# Patient Record
(demographics unavailable — no encounter records)

---

## 2024-12-19 NOTE — PLAN
[FreeTextEntry1] : PLAN: - Continue Lamotrigine 100mg BID (4mg/kg/day) - Continue Oxcarbazepine 600mg BID (24mg/kg/day) - Folic acid qD - Adderall 10mg qD increase to 20mg qD in 1 month when refill due - CBC, CMP, ferritin, Oxcarbazepine level, Lamotrigine level.  - MRI epilepsy protocol w/ sedation.  - Seizure precautions discussed. - RTC in 3-4 months.

## 2024-12-19 NOTE — CONSULT LETTER
[Dear  ___] : Dear  [unfilled], [Consult Letter:] : I had the pleasure of evaluating your patient, [unfilled]. [Please see my note below.] : Please see my note below. [Consult Closing:] : Thank you very much for allowing me to participate in the care of this patient.  If you have any questions, please do not hesitate to contact me. [Sincerely,] : Sincerely, [FreeTextEntry3] : Jason Wilkes MD PGY- 4 Child Neurology [facility]  Shashank Pantoja MD, FAAN, FAASM Director, Division of Pediatric Neurology Co-Director, Sleep Program for Children (Neurology) [facility]  Professor of Pediatrics & Neurology [employer] at [facility] and [facility] [address] [place] [address] (T) [phone]  (F) [phone]

## 2024-12-19 NOTE — QUALITY MEASURES
[Seizure frequency] : Seizure frequency: Yes [Etiology, seizure type, and epilepsy syndrome] : Etiology, seizure type, and epilepsy syndrome: Yes [Side effects of anti-seizure medications] : Side effects of anti-seizure medications: Yes [Safety and education around seizures] : Safety and education around seizures: Yes [Sudden unexpected death in epilepsy (SUDEP)] : Sudden unexpected death in epilepsy: Not Applicable [Issues around driving] : Issues around driving: Not Applicable [Screening for anxiety, depression] : Screening for anxiety, depression: Yes [Treatment-resistant epilepsy (every visit)] : Treatment-resistant epilepsy (every visit): Yes [Adherence to medication(s)] : Adherence to medication(s): Yes [Counseling for women of childbearing potential with epilepsy (including folic acid supplement)] : Counseling for women of childbearing potential with epilepsy (including folic acid supplement): Yes [Options for adjunctive therapy (Neurostimulation, CBD, Dietary Therapy, Epilepsy Surgery)] : Options for adjunctive therapy (Neurostimulation, CBD, Dietary Therapy, Epilepsy Surgery): Not Applicable [25 Hydroxy Vitamin D level assessed and Vitamin D3 ordered] : 25 Hydroxy Vitamin D level assessed and Vitamin D3 ordered: Yes [Thyroid profile ordered] : Thyroid profile ordered: Not Applicable

## 2024-12-19 NOTE — HISTORY OF PRESENT ILLNESS
[FreeTextEntry1] : Matt is a 15-year-old girl with focal (bifrontal) epilepsy, L hemiparesis, developmental delay, pmh of cardiac arrest at 6wk old, presenting for follow up. Last seen in clinic September 2024.   Interval history:  Patient had an episode on 11/25 at school that involved her standing up, unbuttoning her skirt, and standing there for a few seconds before regaining awarenss. She does not remember episode. Mom states that patient has had persistent difficulty w/ memory, as well as moodiness. She has also noticed that patient is talking to herself more than prior, and that she is more fidgety than before. She continues to receive therapies at school and sees a counselor there.      Current meds: Lamotrigine 100mg BID (4mg/kg/day) Oxcarbazepine 600mg BID (24.5)   AEEG (3/18/24): Occasional right and left spike and slow wave with maximal negativity in the frontal regions.     MRI (4/2022):  wnl.

## 2024-12-19 NOTE — ASSESSMENT
[FreeTextEntry1] : 15y F w/ focal (bifrontal epilepsy), L hemiparesis, developmental delay, pmh of cardiac arrest at 6wk of age presenting for follow up. Patient relatively stable w/ stable exam. Unclear if episode on 11/25 was epileptiform in nature, did not seem to have true automatisms that persisted after unbuttoning skirt. REEG performed in office today showed asymmetry w/ more slowing on L  side. Will repeat MRI to evaluate for any structural lesions. Discussed w/ mom that longstanding talking to herself is likely behavioral and not epileptiform or encephalitic in nature. Discussed that her fidgeting is likely also behavioral but will trial Adderall 10mg and increase in 1 month to 20mg qD. Will check levels today, and add folic acid. RTC in 3-4 months.

## 2024-12-19 NOTE — PHYSICAL EXAM
[Well-appearing] : well-appearing [Normocephalic] : normocephalic [Alert] : alert [Well related, good eye contact] : well related, good eye contact [Conversant] : conversant [Normal speech and language] : normal speech and language [Follows instructions well] : follows instructions well [VFF] : VFF [Pupils reactive to light and accommodation] : pupils reactive to light and accommodation [Full extraocular movements] : full extraocular movements [Saccadic and smooth pursuits intact] : saccadic and smooth pursuits intact [No nystagmus] : no nystagmus [Normal facial sensation to light touch] : normal facial sensation to light touch [No facial asymmetry or weakness] : no facial asymmetry or weakness [Gross hearing intact] : gross hearing intact [Equal palate elevation] : equal palate elevation [Good shoulder shrug] : good shoulder shrug [Normal tongue movement] : normal tongue movement [Midline tongue, no fasciculations] : midline tongue, no fasciculations [R handed] : R handed [Normal axial and appendicular muscle tone] : normal axial and appendicular muscle tone [Gets up on table without difficulty] : gets up on table without difficulty [No pronator drift] : no pronator drift [No abnormal involuntary movements] : no abnormal involuntary movements [5/5 strength in proximal and distal muscles of arms and legs] : 5/5 strength in proximal and distal muscles of arms and legs [Walks and runs well] : walks and runs well [Able to do deep knee bend] : able to do deep knee bend [Able to walk on heels] : able to walk on heels [Able to walk on toes] : able to walk on toes [2+ biceps] : 2+ biceps [Triceps] : triceps [Knee jerks] : knee jerks [Ankle jerks] : ankle jerks [Localizes LT and temperature] : localizes LT and temperature [No dysmetria on FTNT] : no dysmetria on FTNT [Good walking balance] : good walking balance [Normal gait] : normal gait [de-identified] : missing 3/4/5th finger in L hand [de-identified] : responds slowly but follows instructions well.

## 2025-03-22 NOTE — PHYSICAL EXAM
[Well-appearing] : well-appearing [Normocephalic] : normocephalic [Alert] : alert [Well related, good eye contact] : well related, good eye contact [Conversant] : conversant [Normal speech and language] : normal speech and language [Follows instructions well] : follows instructions well [VFF] : VFF [Pupils reactive to light and accommodation] : pupils reactive to light and accommodation [Full extraocular movements] : full extraocular movements [Saccadic and smooth pursuits intact] : saccadic and smooth pursuits intact [No nystagmus] : no nystagmus [Normal facial sensation to light touch] : normal facial sensation to light touch [No facial asymmetry or weakness] : no facial asymmetry or weakness [Gross hearing intact] : gross hearing intact [Equal palate elevation] : equal palate elevation [Good shoulder shrug] : good shoulder shrug [Normal tongue movement] : normal tongue movement [Midline tongue, no fasciculations] : midline tongue, no fasciculations [R handed] : R handed [Normal axial and appendicular muscle tone] : normal axial and appendicular muscle tone [Gets up on table without difficulty] : gets up on table without difficulty [No pronator drift] : no pronator drift [No abnormal involuntary movements] : no abnormal involuntary movements [5/5 strength in proximal and distal muscles of arms and legs] : 5/5 strength in proximal and distal muscles of arms and legs [Walks and runs well] : walks and runs well [Able to do deep knee bend] : able to do deep knee bend [Able to walk on heels] : able to walk on heels [Able to walk on toes] : able to walk on toes [2+ biceps] : 2+ biceps [Triceps] : triceps [Knee jerks] : knee jerks [Ankle jerks] : ankle jerks [Localizes LT and temperature] : localizes LT and temperature [No dysmetria on FTNT] : no dysmetria on FTNT [Good walking balance] : good walking balance [Normal gait] : normal gait [de-identified] : missing 3/4/5th finger in L hand [de-identified] : responds slowly but follows instructions well.

## 2025-03-22 NOTE — CONSULT LETTER
[Dear  ___] : Dear  [unfilled], [Courtesy Letter:] : I had the pleasure of seeing your patient, [unfilled], in my office today. [Please see my note below.] : Please see my note below. [Consult Closing:] : Thank you very much for allowing me to participate in the care of this patient.  If you have any questions, please do not hesitate to contact me. [Sincerely,] : Sincerely, [FreeTextEntry3] : María Liu MD Director, Pediatric Epilepsy [facility] , Pediatric Neurology Residency , [employer] [facility] at [address], [address] [place] [address] Phone: [phone] Fax: [phone]

## 2025-03-22 NOTE — DATA REVIEWED
[FreeTextEntry1] : ACC: [id number]     EXAM:  MR BRAIN   ORDERED BY: SHINE GARBER  PROCEDURE DATE:  02/04/2025    INTERPRETATION:  Exam: MR of the brain without contrast  CLINICAL INDICATION: [15 year-old female with developmental delay and seizures  COMPARISON: [MR of the brain from April 2022  TECHNIQUE: Multiplanar multisequence images were acquired through the brain without contrast  FINDINGS: Sagittal images intracranial midline structures.. There is fibrous dysplasia involving the clivus which appears somewhat more extensive than on the CT scan of the head from 2018. There is no change in the presumed fibrous dysplasia of the left base the occiput. No impingement on neural structures.  Normal ventricular size and configuration.. No pathologic extracerebral fluid collections. No areas of abnormal signal intensity within the brain parenchyma. Normal basal ganglia, brainstem, and cerebellum.  No pathologic T2 shortening within the brain parenchyma. Both hippocampal formations are subjectively small. Normal inner ear limbic arches. No signal abnormalities within the hippocampal formations  Gliosis within the cerebellum at the junction of the folia and deep cerebellar white matter; right slightly greater than left and deep within the primary cerebellar fissure. No cerebellar atrophy.  Grossly normal signal voids within major intracranial vascular structures.  IMPRESSION: Subjective impression of small hippocampal formations which are unchanged in appearance since the previous study. 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.209) Depressed mood (799.29) (R45.89) Developmental delay (783.40) (R62.50) Dissociated vertical deviation (378.87) (H51.8) Eczema (692.9) (L30.9) Encounter for physical therapy (V57.1) Exotropia, intermittent (378.20) (H50.30) Falling episodes (781.99,E888.9) (R29.6) 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 A digital recording along with continuous video recording was obtained placing electrodes utilizing the International 10-20 System of electrode placement. A single channel EKG was also recorded. Standard montages were used for review.   Background The background activity during wakefulness was well organized. It was comprised of symmetric mixture of frequencies appropriate for the patient's age. There was a well-modulated 9 Hz posterior dominant rhythm of   30 microvolts amplitude, responsive to eye opening and eye closure. A normal anterior to posterior gradient was present.   Interictal Activity/Events None. Rare bilateral polymorphic theta/delta frequency slowing (L>R).   Attenuation & Asymmetry None.   Activation Procedures Intermittent photic stimulation in incremental frequencies up to 30 Hz did not produce any abnormal activation of epileptiform activity. Hyperventilation was not performed.   EKG No clear abnormalities were noted.   Impression Abnormal: - Rare bilateral polymorphic theta/delta frequency slowing (left>right).   Clinical Correlation The above findings are indicative of bilateral cerebral dysfunction (L>R).

## 2025-03-22 NOTE — ASSESSMENT
[FreeTextEntry1] : 15y F w/ focal (bifrontal epilepsy), L hemiparesis, developmental delay, pmh of cardiac arrest at 6wk of age presenting for follow up. MRI of the head shows cerebellar gliosis but no MTS. Also noted small hippocampi and skull base fibrous dysplasia. I will get neuropsych testing.

## 2025-03-22 NOTE — CONSULT LETTER
[Dear  ___] : Dear  [unfilled], [Courtesy Letter:] : I had the pleasure of seeing your patient, [unfilled], in my office today. [Please see my note below.] : Please see my note below. [Consult Closing:] : Thank you very much for allowing me to participate in the care of this patient.  If you have any questions, please do not hesitate to contact me. [Sincerely,] : Sincerely, [FreeTextEntry3] : María Liu MD Director, Pediatric Epilepsy [facility] , Pediatric Neurology Residency , [employer] [facility] at [address], [address] [place] [address] Phone: [phone] Fax: [phone]

## 2025-03-22 NOTE — PHYSICAL EXAM
[Well-appearing] : well-appearing [Normocephalic] : normocephalic [Alert] : alert [Well related, good eye contact] : well related, good eye contact [Conversant] : conversant [Normal speech and language] : normal speech and language [Follows instructions well] : follows instructions well [VFF] : VFF [Pupils reactive to light and accommodation] : pupils reactive to light and accommodation [Full extraocular movements] : full extraocular movements [Saccadic and smooth pursuits intact] : saccadic and smooth pursuits intact [No nystagmus] : no nystagmus [Normal facial sensation to light touch] : normal facial sensation to light touch [No facial asymmetry or weakness] : no facial asymmetry or weakness [Gross hearing intact] : gross hearing intact [Equal palate elevation] : equal palate elevation [Good shoulder shrug] : good shoulder shrug [Normal tongue movement] : normal tongue movement [Midline tongue, no fasciculations] : midline tongue, no fasciculations [R handed] : R handed [Normal axial and appendicular muscle tone] : normal axial and appendicular muscle tone [Gets up on table without difficulty] : gets up on table without difficulty [No pronator drift] : no pronator drift [No abnormal involuntary movements] : no abnormal involuntary movements [5/5 strength in proximal and distal muscles of arms and legs] : 5/5 strength in proximal and distal muscles of arms and legs [Walks and runs well] : walks and runs well [Able to do deep knee bend] : able to do deep knee bend [Able to walk on heels] : able to walk on heels [Able to walk on toes] : able to walk on toes [2+ biceps] : 2+ biceps [Triceps] : triceps [Knee jerks] : knee jerks [Ankle jerks] : ankle jerks [Localizes LT and temperature] : localizes LT and temperature [No dysmetria on FTNT] : no dysmetria on FTNT [Good walking balance] : good walking balance [Normal gait] : normal gait [de-identified] : missing 3/4/5th finger in L hand [de-identified] : responds slowly but follows instructions well.

## 2025-03-22 NOTE — DATA REVIEWED
[FreeTextEntry1] : ACC: [id number]     EXAM:  MR BRAIN   ORDERED BY: SHINE GARBER  PROCEDURE DATE:  02/04/2025    INTERPRETATION:  Exam: MR of the brain without contrast  CLINICAL INDICATION: [15 year-old female with developmental delay and seizures  COMPARISON: [MR of the brain from April 2022  TECHNIQUE: Multiplanar multisequence images were acquired through the brain without contrast  FINDINGS: Sagittal images intracranial midline structures.. There is fibrous dysplasia involving the clivus which appears somewhat more extensive than on the CT scan of the head from 2018. There is no change in the presumed fibrous dysplasia of the left base the occiput. No impingement on neural structures.  Normal ventricular size and configuration.. No pathologic extracerebral fluid collections. No areas of abnormal signal intensity within the brain parenchyma. Normal basal ganglia, brainstem, and cerebellum.  No pathologic T2 shortening within the brain parenchyma. Both hippocampal formations are subjectively small. Normal inner ear limbic arches. No signal abnormalities within the hippocampal formations  Gliosis within the cerebellum at the junction of the folia and deep cerebellar white matter; right slightly greater than left and deep within the primary cerebellar fissure. No cerebellar atrophy.  Grossly normal signal voids within major intracranial vascular structures.  IMPRESSION: Subjective impression of small hippocampal formations which are unchanged in appearance since the previous study. 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.209) Depressed mood (799.29) (R45.89) Developmental delay (783.40) (R62.50) Dissociated vertical deviation (378.87) (H51.8) Eczema (692.9) (L30.9) Encounter for physical therapy (V57.1) Exotropia, intermittent (378.20) (H50.30) Falling episodes (781.99,E888.9) (R29.6) 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 A digital recording along with continuous video recording was obtained placing electrodes utilizing the International 10-20 System of electrode placement. A single channel EKG was also recorded. Standard montages were used for review.   Background The background activity during wakefulness was well organized. It was comprised of symmetric mixture of frequencies appropriate for the patient's age. There was a well-modulated 9 Hz posterior dominant rhythm of   30 microvolts amplitude, responsive to eye opening and eye closure. A normal anterior to posterior gradient was present.   Interictal Activity/Events None. Rare bilateral polymorphic theta/delta frequency slowing (L>R).   Attenuation & Asymmetry None.   Activation Procedures Intermittent photic stimulation in incremental frequencies up to 30 Hz did not produce any abnormal activation of epileptiform activity. Hyperventilation was not performed.   EKG No clear abnormalities were noted.   Impression Abnormal: - Rare bilateral polymorphic theta/delta frequency slowing (left>right).   Clinical Correlation The above findings are indicative of bilateral cerebral dysfunction (L>R).

## 2025-03-22 NOTE — QUALITY MEASURES
[Seizure frequency] : Seizure frequency: Yes [Etiology, seizure type, and epilepsy syndrome] : Etiology, seizure type, and epilepsy syndrome: Yes [Side effects of anti-seizure medications] : Side effects of anti-seizure medications: Yes [Safety and education around seizures] : Safety and education around seizures: Yes [Sudden unexpected death in epilepsy (SUDEP)] : Sudden unexpected death in epilepsy: Not Applicable [Issues around driving] : Issues around driving: Not Applicable [Screening for anxiety, depression] : Screening for anxiety, depression: Yes [Treatment-resistant epilepsy (every visit)] : Treatment-resistant epilepsy (every visit): Yes [Adherence to medication(s)] : Adherence to medication(s): Yes [Options for adjunctive therapy (Neurostimulation, CBD, Dietary Therapy, Epilepsy Surgery)] : Options for adjunctive therapy (Neurostimulation, CBD, Dietary Therapy, Epilepsy Surgery): Not Applicable [Counseling for women of childbearing potential with epilepsy (including folic acid supplement)] : Counseling for women of childbearing potential with epilepsy (including folic acid supplement): Yes [25 Hydroxy Vitamin D level assessed and Vitamin D3 ordered] : 25 Hydroxy Vitamin D level assessed and Vitamin D3 ordered: Yes [Thyroid profile ordered] : Thyroid profile ordered: Not Applicable

## 2025-03-22 NOTE — DATA REVIEWED
[FreeTextEntry1] : ACC: [id number]     EXAM:  MR BRAIN   ORDERED BY: SHINE GARBER  PROCEDURE DATE:  02/04/2025    INTERPRETATION:  Exam: MR of the brain without contrast  CLINICAL INDICATION: [15 year-old female with developmental delay and seizures  COMPARISON: [MR of the brain from April 2022  TECHNIQUE: Multiplanar multisequence images were acquired through the brain without contrast  FINDINGS: Sagittal images intracranial midline structures.. There is fibrous dysplasia involving the clivus which appears somewhat more extensive than on the CT scan of the head from 2018. There is no change in the presumed fibrous dysplasia of the left base the occiput. No impingement on neural structures.  Normal ventricular size and configuration.. No pathologic extracerebral fluid collections. No areas of abnormal signal intensity within the brain parenchyma. Normal basal ganglia, brainstem, and cerebellum.  No pathologic T2 shortening within the brain parenchyma. Both hippocampal formations are subjectively small. Normal inner ear limbic arches. No signal abnormalities within the hippocampal formations  Gliosis within the cerebellum at the junction of the folia and deep cerebellar white matter; right slightly greater than left and deep within the primary cerebellar fissure. No cerebellar atrophy.  Grossly normal signal voids within major intracranial vascular structures.  IMPRESSION: Subjective impression of small hippocampal formations which are unchanged in appearance since the previous study. 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.209) Depressed mood (799.29) (R45.89) Developmental delay (783.40) (R62.50) Dissociated vertical deviation (378.87) (H51.8) Eczema (692.9) (L30.9) Encounter for physical therapy (V57.1) Exotropia, intermittent (378.20) (H50.30) Falling episodes (781.99,E888.9) (R29.6) 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 A digital recording along with continuous video recording was obtained placing electrodes utilizing the International 10-20 System of electrode placement. A single channel EKG was also recorded. Standard montages were used for review.   Background The background activity during wakefulness was well organized. It was comprised of symmetric mixture of frequencies appropriate for the patient's age. There was a well-modulated 9 Hz posterior dominant rhythm of   30 microvolts amplitude, responsive to eye opening and eye closure. A normal anterior to posterior gradient was present.   Interictal Activity/Events None. Rare bilateral polymorphic theta/delta frequency slowing (L>R).   Attenuation & Asymmetry None.   Activation Procedures Intermittent photic stimulation in incremental frequencies up to 30 Hz did not produce any abnormal activation of epileptiform activity. Hyperventilation was not performed.   EKG No clear abnormalities were noted.   Impression Abnormal: - Rare bilateral polymorphic theta/delta frequency slowing (left>right).   Clinical Correlation The above findings are indicative of bilateral cerebral dysfunction (L>R).

## 2025-03-22 NOTE — PHYSICAL EXAM
[Well-appearing] : well-appearing [Normocephalic] : normocephalic [Alert] : alert [Well related, good eye contact] : well related, good eye contact [Conversant] : conversant [Normal speech and language] : normal speech and language [Follows instructions well] : follows instructions well [VFF] : VFF [Pupils reactive to light and accommodation] : pupils reactive to light and accommodation [Full extraocular movements] : full extraocular movements [Saccadic and smooth pursuits intact] : saccadic and smooth pursuits intact [No nystagmus] : no nystagmus [Normal facial sensation to light touch] : normal facial sensation to light touch [No facial asymmetry or weakness] : no facial asymmetry or weakness [Gross hearing intact] : gross hearing intact [Equal palate elevation] : equal palate elevation [Good shoulder shrug] : good shoulder shrug [Normal tongue movement] : normal tongue movement [Midline tongue, no fasciculations] : midline tongue, no fasciculations [R handed] : R handed [Normal axial and appendicular muscle tone] : normal axial and appendicular muscle tone [Gets up on table without difficulty] : gets up on table without difficulty [No pronator drift] : no pronator drift [No abnormal involuntary movements] : no abnormal involuntary movements [5/5 strength in proximal and distal muscles of arms and legs] : 5/5 strength in proximal and distal muscles of arms and legs [Walks and runs well] : walks and runs well [Able to do deep knee bend] : able to do deep knee bend [Able to walk on heels] : able to walk on heels [Able to walk on toes] : able to walk on toes [2+ biceps] : 2+ biceps [Triceps] : triceps [Knee jerks] : knee jerks [Ankle jerks] : ankle jerks [Localizes LT and temperature] : localizes LT and temperature [No dysmetria on FTNT] : no dysmetria on FTNT [Good walking balance] : good walking balance [Normal gait] : normal gait [de-identified] : missing 3/4/5th finger in L hand [de-identified] : responds slowly but follows instructions well.

## 2025-03-22 NOTE — HISTORY OF PRESENT ILLNESS
[FreeTextEntry1] :     Matt is a 15-year-old girl with focal (bifrontal) epilepsy, L hemiparesis, developmental delay, pmh of cardiac arrest at 6wk old, presenting for follow up. Last seen in clinic December 2024. No further seizures since 11/25/2024.  Current meds: Lamotrigine 100mg BID (4mg/kg/day) Oxcarbazepine 600mg BID (24.5)  AEEG (3/18/24): Occasional right and left spike and slow wave with maximal negativity in the frontal regions.   MRI (4/2022): wnl.

## 2025-03-22 NOTE — CONSULT LETTER
[Dear  ___] : Dear  [unfilled], [Courtesy Letter:] : I had the pleasure of seeing your patient, [unfilled], in my office today. [Please see my note below.] : Please see my note below. [Consult Closing:] : Thank you very much for allowing me to participate in the care of this patient.  If you have any questions, please do not hesitate to contact me. [Sincerely,] : Sincerely, [FreeTextEntry3] : María Liu MD Director, Pediatric Epilepsy [facility] , Pediatric Neurology Residency , [employer] [facility] at [address], [address] [place] [address] Phone: [phone] Fax: [phone]

## 2025-03-25 NOTE — DATA REVIEWED
[de-identified] : My interpretation and review of images taken today, 03/25/2025, in office:  AP/Lat scoliosis obtained and reviewed today with family. There is an asymmetry of the spine noted on AP films, no scoliosis. Normal kyphosis and lordosis on lateral. No spondylolysis or spondylolisthesis noted.   Weightbearing x-rays of both feet taken today are fairly unremarkable.  No evidence of coalition or other osseous abnormalities.

## 2025-03-25 NOTE — ASSESSMENT
[FreeTextEntry1] : 15-year-old female with history for cardiac arrest at 6 weeks of age now with developmental delay and spasticity who brought in today for the right foot turning in a well as a scoliosis evaluation.  The history was obtained today from the child and parent; given the patient's age and/or the child's mental capacity, the history was unreliable and the parent was used as an independent historian.   Matt's clinical exam and radiographs were thoroughly discussed with family today.  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  I believe these muscle balances are likely the cause of her foot pointing inwards.  I would initially like to try bracing to see if this is something that helps.  I recommend an orthotic with lateral posting to help bring the foot into a more valgus position.  Our orthotic colleagues from [facility] met with family today to initiate this process.  If we have success with the orthotics, I would also consider possibly utilizing Botox to weaken the posterior tibialis and abductor houses muscle groups to see if this can provide further help.  We will plan to see her back 2 months after she receives orthotics to monitor her progress.  We will consider Botox at that time if appropriate.  In regard to her spine, I reassured family that x-rays do not show any significant curvature.  Observation recommended.  This plan was discussed with family and all questions and concerns were addressed today.  FU 2 months after orthotics obtained to monitor her progress  I, Wen Andrade PA-C, have acted as a scribe and documented the above for Dr. Yo  The above documentation completed by the scribe is an accurate record of both my words and actions.

## 2025-03-25 NOTE — HISTORY OF PRESENT ILLNESS
[FreeTextEntry1] : Matt is a 15-year-old female who is brought in today by her mother for evaluation of her right foot and spine.  Mother states that the experienced a cardiac arrest at 6 weeks of age secondary to possible dehydration.  During her episode of cardiac arrest, she had a seizure and was placed on phenobarbital.  MRI done in August 2013 was normal and an EEG done in August 2013 showed potential epileptogenic foci over the right central and temporal regions.  She has history for developmental delay and left hemiparesis. She is followed by neurology.  She also previously had another orthopedist per mother.  Mother states that her concern today is mainly regarding the right foot.  She states the child typically turns the right foot inwards and also walks to the lateral border of the foot.  She has calluses developing on the foot.  She feels that it has not improved over time and is today seeking pediatric orthopedic evaluation and management.  She also mentions that she was told to have her spine evaluated as well.  There are no other concerns or complaints of pain today.  She is here for pediatric orthopedic evaluation and management.

## 2025-03-25 NOTE — REASON FOR VISIT
[Consultation] : a consultation visit [Patient] : patient [Mother] : mother [FreeTextEntry1] : right foot turns in, scoli check

## 2025-03-25 NOTE — REVIEW OF SYSTEMS
[Eczema] : eczema [Asthma] : asthma [Seizure] : seizures [Change in Activity] : no change in activity [Malaise] : no malaise [Cough] : no cough

## 2025-03-25 NOTE — PHYSICAL EXAM
[FreeTextEntry1] : Healthy appearing 15 year-old child. Awake, alert, in no acute distress. Pleasant and cooperative.  Eyes are clear with no sclera abnormalities. External ears, nose and mouth are clear.  Good respiratory effort with no audible wheezing without use of a stethoscope. Ambulates independently with no evidence of antalgia. Good coordination and balance. Able to get on and off exam table without difficulty.   Focused exam of the lower extremities: At rest, her right foot appears to be dynamically supinated with overactivity of the posterior tibialis and adductor pollicis.  The foot seems somewhat flexible and can be brought to neutral.  When standing or ambulating, she does appear to have an active overpull of the posterior tibialis and abductor houses causing her to bear more weight slightly to the lateral border of the foot.  There are mild calluses developed.  She has full sensation to the foot and can actively dorsiflex and plantarflex.  There is generalized spasticity noted on exam. She is able to dorsiflex above neutral with the knee in extension and flexion.  DP 2+, BCR

## 2025-03-25 NOTE — DEVELOPMENTAL MILESTONES
[Roll Over: ___ Months] : Roll Over: [unfilled] months [Sit Up: ___ Months] : Sit Up: [unfilled] months [Pull Self to Stand ___ Months] : Pull self to stand: [unfilled] months [Walk ___ Months] : Walk: [unfilled] months [Verbally] : verbally [Right] : right [FreeTextEntry2] : developmental delay